# Patient Record
Sex: MALE | Race: OTHER | Employment: UNEMPLOYED | URBAN - METROPOLITAN AREA
[De-identification: names, ages, dates, MRNs, and addresses within clinical notes are randomized per-mention and may not be internally consistent; named-entity substitution may affect disease eponyms.]

---

## 2022-04-08 ENCOUNTER — TRANSCRIBE ORDER (OUTPATIENT)
Dept: SCHEDULING | Age: 62
End: 2022-04-08

## 2022-04-08 DIAGNOSIS — C25.9 SOLID PSEUDOPAPILLARY CARCINOMA OF PANCREAS (HCC): Primary | ICD-10-CM

## 2022-04-08 DIAGNOSIS — Z12.11 COLON CANCER SCREENING: ICD-10-CM

## 2022-04-08 DIAGNOSIS — K86.89 PANCREATIC MASS: ICD-10-CM

## 2022-04-29 ENCOUNTER — HOSPITAL ENCOUNTER (OUTPATIENT)
Dept: CT IMAGING | Age: 62
Discharge: HOME | End: 2022-04-29
Attending: INTERNAL MEDICINE
Payer: SUBSIDIZED

## 2022-04-29 DIAGNOSIS — C25.9 SOLID PSEUDOPAPILLARY CARCINOMA OF PANCREAS (HCC): ICD-10-CM

## 2022-04-29 DIAGNOSIS — Z12.11 COLON CANCER SCREENING: ICD-10-CM

## 2022-04-29 DIAGNOSIS — K86.89 PANCREATIC MASS: ICD-10-CM

## 2022-04-29 LAB — CREAT BLD-MCNC: 0.8 MG/DL (ref 0.6–1.3)

## 2022-04-29 PROCEDURE — 74011000636 HC RX REV CODE- 636: Performed by: INTERNAL MEDICINE

## 2022-04-29 PROCEDURE — 82565 ASSAY OF CREATININE: CPT

## 2022-04-29 PROCEDURE — 74170 CT ABD WO CNTRST FLWD CNTRST: CPT

## 2022-04-29 RX ADMIN — IOPAMIDOL 100 ML: 755 INJECTION, SOLUTION INTRAVENOUS at 09:31

## 2022-07-06 ENCOUNTER — HOSPITAL ENCOUNTER (OUTPATIENT)
Age: 62
Setting detail: OUTPATIENT SURGERY
Discharge: HOME | End: 2022-07-06
Attending: INTERNAL MEDICINE | Admitting: INTERNAL MEDICINE

## 2022-07-06 ENCOUNTER — ANESTHESIA EVENT (OUTPATIENT)
Dept: ENDOSCOPY | Age: 62
End: 2022-07-06

## 2022-07-06 ENCOUNTER — ANESTHESIA (OUTPATIENT)
Dept: ENDOSCOPY | Age: 62
End: 2022-07-06

## 2022-07-06 VITALS
WEIGHT: 192 LBS | SYSTOLIC BLOOD PRESSURE: 161 MMHG | DIASTOLIC BLOOD PRESSURE: 93 MMHG | HEART RATE: 81 BPM | BODY MASS INDEX: 34.02 KG/M2 | HEIGHT: 63 IN | RESPIRATION RATE: 23 BRPM | TEMPERATURE: 98.7 F | OXYGEN SATURATION: 93 %

## 2022-07-06 PROCEDURE — 74011000250 HC RX REV CODE- 250: Performed by: ANESTHESIOLOGY

## 2022-07-06 PROCEDURE — 76040000019: Performed by: INTERNAL MEDICINE

## 2022-07-06 PROCEDURE — 76060000031 HC ANESTHESIA FIRST 0.5 HR: Performed by: INTERNAL MEDICINE

## 2022-07-06 PROCEDURE — 74011250636 HC RX REV CODE- 250/636: Performed by: ANESTHESIOLOGY

## 2022-07-06 PROCEDURE — 2709999900 HC NON-CHARGEABLE SUPPLY: Performed by: INTERNAL MEDICINE

## 2022-07-06 PROCEDURE — 74011250636 HC RX REV CODE- 250/636: Performed by: INTERNAL MEDICINE

## 2022-07-06 RX ORDER — DEXTROMETHORPHAN/PSEUDOEPHED 2.5-7.5/.8
1.2 DROPS ORAL
Status: DISCONTINUED | OUTPATIENT
Start: 2022-07-06 | End: 2022-07-06 | Stop reason: HOSPADM

## 2022-07-06 RX ORDER — NALOXONE HYDROCHLORIDE 0.4 MG/ML
0.4 INJECTION, SOLUTION INTRAMUSCULAR; INTRAVENOUS; SUBCUTANEOUS
Status: DISCONTINUED | OUTPATIENT
Start: 2022-07-06 | End: 2022-07-06 | Stop reason: HOSPADM

## 2022-07-06 RX ORDER — FLUMAZENIL 0.1 MG/ML
0.2 INJECTION INTRAVENOUS
Status: DISCONTINUED | OUTPATIENT
Start: 2022-07-06 | End: 2022-07-06 | Stop reason: HOSPADM

## 2022-07-06 RX ORDER — EPINEPHRINE 0.1 MG/ML
1 INJECTION INTRACARDIAC; INTRAVENOUS
Status: DISCONTINUED | OUTPATIENT
Start: 2022-07-06 | End: 2022-07-06 | Stop reason: HOSPADM

## 2022-07-06 RX ORDER — LIDOCAINE HYDROCHLORIDE 20 MG/ML
INJECTION, SOLUTION EPIDURAL; INFILTRATION; INTRACAUDAL; PERINEURAL AS NEEDED
Status: DISCONTINUED | OUTPATIENT
Start: 2022-07-06 | End: 2022-07-06 | Stop reason: HOSPADM

## 2022-07-06 RX ORDER — SODIUM CHLORIDE 9 MG/ML
75 INJECTION, SOLUTION INTRAVENOUS CONTINUOUS
Status: DISCONTINUED | OUTPATIENT
Start: 2022-07-06 | End: 2022-07-06 | Stop reason: HOSPADM

## 2022-07-06 RX ORDER — PROPOFOL 10 MG/ML
INJECTION, EMULSION INTRAVENOUS AS NEEDED
Status: DISCONTINUED | OUTPATIENT
Start: 2022-07-06 | End: 2022-07-06 | Stop reason: HOSPADM

## 2022-07-06 RX ORDER — ATROPINE SULFATE 0.1 MG/ML
0.5 INJECTION INTRAVENOUS
Status: DISCONTINUED | OUTPATIENT
Start: 2022-07-06 | End: 2022-07-06 | Stop reason: HOSPADM

## 2022-07-06 RX ORDER — SODIUM CHLORIDE 0.9 % (FLUSH) 0.9 %
5-40 SYRINGE (ML) INJECTION AS NEEDED
Status: DISCONTINUED | OUTPATIENT
Start: 2022-07-06 | End: 2022-07-06 | Stop reason: HOSPADM

## 2022-07-06 RX ORDER — SODIUM CHLORIDE 0.9 % (FLUSH) 0.9 %
5-40 SYRINGE (ML) INJECTION EVERY 8 HOURS
Status: DISCONTINUED | OUTPATIENT
Start: 2022-07-06 | End: 2022-07-06 | Stop reason: HOSPADM

## 2022-07-06 RX ADMIN — PROPOFOL 180 MG: 10 INJECTION, EMULSION INTRAVENOUS at 14:09

## 2022-07-06 RX ADMIN — SODIUM CHLORIDE 75 ML/HR: 9 INJECTION, SOLUTION INTRAVENOUS at 13:53

## 2022-07-06 RX ADMIN — LIDOCAINE HYDROCHLORIDE 40 MG: 20 INJECTION, SOLUTION EPIDURAL; INFILTRATION; INTRACAUDAL; PERINEURAL at 14:00

## 2022-07-06 NOTE — H&P
Gastroenterology Outpatient History and Physical    Patient: Maia Homans    Physician: Zaida Lowery MD    Chief Complaint: CRC screening  History of Present Illness: No GI complaints    History:  Past Medical History:   Diagnosis Date    Hypertension     No past surgical history on file. Social History     Socioeconomic History    Marital status: SINGLE    History reviewed. No pertinent family history. There is no problem list on file for this patient. Allergies: No Known Allergies  Medications:   Prior to Admission medications    Not on File     Physical Exam:   Vital Signs: Blood pressure (!) 143/96, pulse 86, resp. rate (!) 94, height 5' 3\" (1.6 m), weight 87.1 kg (192 lb), SpO2 94 %.   General: well developed, well nourished   HEENT: unremarkable   Heart: regular rhythm no mumur    Lungs: clear   Abdominal:  benign   Neurological: unremarkable   Extremities: no edema     Findings/Diagnosis: CRC screening  Plan of Care/Planned Procedure: Colonoscopy with conscious/deep sedation    Signed:  Zaida Lowery MD 7/6/2022

## 2022-07-06 NOTE — ROUTINE PROCESS
Eduarda Aid  1960  [id number]    Situation:  Verbal report received from: Vinny Bridges  Procedure: Procedure(s):  COLONOSCOPY    Background:    Preoperative diagnosis: Solid pseudopapillary carcinoma of pancreas ([facility] [address] [C25.9]  Mass of pancreas [K86.89]  Colon cancer screening [Z12.11]  Postoperative diagnosis: hemorrhoids    :  Dr. Nya Inman  Assistant(s): Endoscopy Technician-1: Erinn Mead  Endoscopy RN-1: Maya Frazier RN    Specimens: * No specimens in log *  H. Pylori  no    Assessment:  Intra-procedure medications     Anesthesia gave intra-procedure sedation and medications, see anesthesia flow sheet yes    Intravenous fluids: NS@ KVO     Vital signs stable     Abdominal assessment: round and soft     Recommendation:  Discharge patient per MD order.   Return to floor  Family or Friend   Permission to share finding with family or friend yes

## 2022-07-06 NOTE — PROCEDURES
NAME:  Simin Dean   :   1960   MRN:   [id number]     Date/Time:  2022 2:11 PM    Colonoscopy Operative Report    Procedure Type:   Colonoscopy --screening     Indications:     Screening colonoscopy  Pre-operative Diagnosis: see indication above  Post-operative Diagnosis:  See findings below  :  Roma Meza MD  Referring Provider: --None    Exam:  Airway: clear, no airway problems anticipated  Heart: RRR, without gallops or rubs  Lungs: clear bilaterally without wheezes, crackles, or rhonchi  Abdomen: soft, nontender, nondistended, bowel sounds present  Mental Status: awake, alert and oriented to person, place and time    Sedation:  MAC anesthesia Propofol  Procedure Details:  After informed consent was obtained with all risks and benefits of procedure explained and preoperative exam completed, the patient was taken to the endoscopy suite and placed in the left lateral decubitus position. Upon sequential sedation as per above, a digital rectal exam was performed demonstrating internal hemorrhoids. The Olympus videocolonoscope  was inserted in the rectum and carefully advanced to the cecum, which was identified by the ileocecal valve and appendiceal orifice. The quality of preparation was good. The colonoscope was slowly withdrawn with careful evaluation between folds. Retroflexion in the rectum was completed demonstrating internal hemorrhoids. Findings:  1. Normal colonoscopy through to the cecum  2. Medium sized internal hemorrhoids seen on retroflexion    Specimen Removed:  None  Complications: None. EBL:  None. Impression:   1. Normal colonoscopy through to the cecum  2. Medium sized internal hemorrhoids seen on retroflexion    Recommendations:   1. Repeat colonoscopy in 10 years for screening     Discharge Disposition:  Home in the company of a  when able to ambulate.       Madisyn Gerard MD

## 2022-07-06 NOTE — ANESTHESIA POSTPROCEDURE EVALUATION
Procedure(s):  COLONOSCOPY.    total IV anesthesia    Anesthesia Post Evaluation        Patient location during evaluation: PACU  Note status: Adequate. Level of consciousness: responsive to verbal stimuli and sleepy but conscious  Pain management: satisfactory to patient  Airway patency: patent  Anesthetic complications: no  Cardiovascular status: acceptable  Respiratory status: acceptable  Hydration status: acceptable  Comments: +Post-Anesthesia Evaluation and Assessment    Patient: Rashmi Willis MRN: [id number]  SSN: [id number]   YOB: 1960  Age: 63 y.o. Sex: male      Cardiovascular Function/Vital Signs    BP (!) 161/93   Pulse 81   Temp 37.1 °C (98.7 °F)   Resp 23   Ht 5' 3\" (1.6 m)   Wt 87.1 kg (192 lb)   SpO2 93%   BMI 34.01 kg/m²     Patient is status post Procedure(s):  COLONOSCOPY. Nausea/Vomiting: Controlled. Postoperative hydration reviewed and adequate. Pain:  Pain Scale 1: Numeric (0 - 10) (07/06/22 1448)  Pain Intensity 1: 0 (07/06/22 1448)   Managed. Neurological Status: At baseline. Mental Status and Level of Consciousness: Arousable. Pulmonary Status:   O2 Device: None (Room air) (07/06/22 1435)   Adequate oxygenation and airway patent. Complications related to anesthesia: None    Post-anesthesia assessment completed. No concerns. Signed By: Dipak Hardwick DO    7/6/2022  Post anesthesia nausea and vomiting:  controlled      INITIAL Post-op Vital signs:   Vitals Value Taken Time   /93 07/06/22 1448   Temp 37.1 °C (98.7 °F) 07/06/22 1436   Pulse 85 07/06/22 1448   Resp 22 07/06/22 1448   SpO2 92 % 07/06/22 1448   Vitals shown include unvalidated device data.

## 2022-07-06 NOTE — DISCHARGE INSTRUCTIONS
[address]  [id number]  1960    COLON DISCHARGE INSTRUCTIONS  Discomfort:  Redness at IV site- apply warm compress to area; if redness or soreness persist- contact your physician  There may be a slight amount of blood passed from the rectum  Gaseous discomfort- walking, belching will help relieve any discomfort  You may not operate a vehicle for 12 hours  You may not engage in an occupation involving machinery or appliances for rest of today  You may not drink alcoholic beverages for at least 12 hours  Avoid making any critical decisions for at least 24 hour  DIET:   Regular diet. - however -  remember your colon is empty and a heavy meal will produce gas. Avoid these foods:  vegetables, fried / greasy foods, carbonated drinks for today  MEDICATION:  Per Medication Reconciliation       ACTIVITY:  You may not resume your normal daily activities until tomorrow AM; it is recommended that you spend the remainder of the day resting -  avoid any strenuous activity. CALL M.D. ANY SIGN OF:   Increasing pain, nausea, vomiting  Abdominal distension (swelling)  New increased bleeding (oral or rectal)  Fever (chills)  Pain in chest area  Bloody discharge from nose or mouth  Shortness of breath    You may  take any Advil, Aspirin, Ibuprofen, Motrin, Aleve, or Goodys for 10 days, ONLY  Tylenol as needed for pain. IMPRESSION:  Impression:   1. Normal colonoscopy through to the cecum  2. Medium sized internal hemorrhoids seen on retroflexion    Recommendations:   1.  Repeat colonoscopy in 10 years for screening     Follow-up Instructions:  Telephone # [phone]    Je Becerra MD

## 2022-07-06 NOTE — PROGRESS NOTES
Interpreters were used for admission as well as consents.  Louis Lerner [phone] for consents and was disconnect.    Janine [phone] for admission and anesthesia

## 2022-07-06 NOTE — ANESTHESIA PREPROCEDURE EVALUATION
Relevant Problems   No relevant active problems       Anesthetic History   No history of anesthetic complications            Review of Systems / Medical History  Patient summary reviewed, nursing notes reviewed and pertinent labs reviewed    Pulmonary  Within defined limits                 Neuro/Psych   Within defined limits           Cardiovascular    Hypertension              Exercise tolerance: >4 METS  Comments: No EKG on file   GI/Hepatic/Renal  Within defined limits             Comments: ?  Mass of pancreas, not seen on current CT     colon cancer screening Endo/Other  Within defined limits           Other Findings   Comments: Snores when sleep         Physical Exam    Airway  Mallampati: III  TM Distance: 4 - 6 cm  Neck ROM: normal range of motion   Mouth opening: Normal     Cardiovascular  Regular rate and rhythm,  S1 and S2 normal,  no murmur, click, rub, or gallop             Dental      Comments: Missing some teeth in front on top   Pulmonary  Breath sounds clear to auscultation               Abdominal  GI exam deferred       Other Findings            Anesthetic Plan    ASA: 2  Anesthesia type: total IV anesthesia          Induction: Intravenous  Anesthetic plan and risks discussed with: Patient

## 2023-01-13 ENCOUNTER — OFFICE VISIT (OUTPATIENT)
Dept: HEMATOLOGY | Age: 63
End: 2023-01-13

## 2023-01-13 VITALS
DIASTOLIC BLOOD PRESSURE: 92 MMHG | HEIGHT: 63 IN | TEMPERATURE: 98.2 F | WEIGHT: 178 LBS | SYSTOLIC BLOOD PRESSURE: 136 MMHG | RESPIRATION RATE: 17 BRPM | OXYGEN SATURATION: 99 % | HEART RATE: 76 BPM | BODY MASS INDEX: 31.54 KG/M2

## 2023-01-13 DIAGNOSIS — K76.89 LIVER CYST: ICD-10-CM

## 2023-01-13 DIAGNOSIS — K86.2 PANCREATIC CYST: Primary | ICD-10-CM

## 2023-01-13 PROBLEM — M86.179 ACUTE OSTEOMYELITIS OF ANKLE (HCC): Status: ACTIVE | Noted: 2021-12-10

## 2023-01-13 PROBLEM — N50.89 MASS OF TESTICLE: Status: ACTIVE | Noted: 2022-06-03

## 2023-01-13 PROBLEM — M86.9 OSTEOMYELITIS OF RIGHT TIBIA (HCC): Status: ACTIVE | Noted: 2021-12-07

## 2023-01-13 PROBLEM — M86.671 CHRONIC OSTEOMYELITIS INVOLVING ANKLE AND FOOT, RIGHT (HCC): Status: ACTIVE | Noted: 2022-01-05

## 2023-01-13 PROBLEM — I10 PRIMARY HYPERTENSION: Status: ACTIVE | Noted: 2022-01-19

## 2023-01-13 PROBLEM — R03.0 ELEVATED BLOOD PRESSURE READING WITHOUT DIAGNOSIS OF HYPERTENSION: Status: ACTIVE | Noted: 2023-01-13

## 2023-01-13 PROBLEM — S82.201R: Status: ACTIVE | Noted: 2022-01-10

## 2023-01-13 PROBLEM — Q45.3 ANOMALY OF PANCREAS: Status: ACTIVE | Noted: 2022-06-03

## 2023-01-13 PROBLEM — M19.91 PRIMARY OSTEOARTHRITIS: Status: ACTIVE | Noted: 2022-06-03

## 2023-01-13 PROBLEM — K08.109 TEETH MISSING: Status: ACTIVE | Noted: 2022-06-03

## 2023-01-13 PROBLEM — S82.401R: Status: ACTIVE | Noted: 2022-01-10

## 2023-01-13 PROBLEM — S82.209A TIBIA FRACTURE: Status: ACTIVE | Noted: 2021-05-20

## 2023-01-13 PROBLEM — S92.901K: Status: ACTIVE | Noted: 2021-12-10

## 2023-01-13 PROBLEM — G57.10 MERALGIA PARESTHETICA: Status: ACTIVE | Noted: 2022-06-03

## 2023-01-13 PROBLEM — E66.9 OBESITY (BMI 30.0-34.9): Status: ACTIVE | Noted: 2022-01-19

## 2023-01-13 RX ORDER — LOSARTAN POTASSIUM 50 MG/1
TABLET ORAL
COMMUNITY

## 2023-01-13 RX ORDER — MULTIVITAMIN
TABLET ORAL
COMMUNITY

## 2023-01-13 RX ORDER — IBUPROFEN 100 MG/5ML
1000 SUSPENSION, ORAL (FINAL DOSE FORM) ORAL DAILY
COMMUNITY

## 2023-01-13 NOTE — PROGRESS NOTES
[facility] [address] [facility] Stephanie Montgomery MD, Rolf Schneider, PA-C    April S Mu, AGPCNP-BC   Irwin Julianna, [facility]   Carey Ivory, FNP-C  Fely Zheng, FNP-C   Angie Easton, AGPCNP-BC      [address]   at [address], [address] [place] [address] Desire Rodrigues  22.   [phone]   FAX: [phone]   at [address], [address]   [phone]   FAX: [phone]       Patient Care Team:  None as PCP - General      Problem List  Date Reviewed: 7/6/2022            Codes Class Noted    Elevated blood pressure reading without diagnosis of hypertension ICD-10-CM: R03.0  ICD-9-CM: 796.2  1/13/2023        Anomaly of pancreas ICD-10-CM: Q45.3  ICD-9-CM: 751.7  6/3/2022        Mass of testicle ICD-10-CM: N50.89  ICD-9-CM: 608.89  6/3/2022        Meralgia paresthetica ICD-10-CM: G57.10  ICD-9-CM: 355.1  6/3/2022        Primary osteoarthritis ICD-10-CM: M19.91  ICD-9-CM: 715.10  6/3/2022        Teeth missing ICD-10-CM: K08.109  ICD-9-CM: 525.10  6/3/2022        Obesity (BMI 30.0-34.9) ICD-10-CM: E66.9  ICD-9-CM: 278.00  1/19/2022        Pancreatic cyst ICD-10-CM: K86.2  ICD-9-CM: 577.2  1/19/2022        Primary hypertension ICD-10-CM: I10  ICD-9-CM: 401.9  1/19/2022        Fracture, tibia and fibula, right, open type III, with malunion, subsequent encounter ICD-10-CM: S82.201R, S82.401R  ICD-9-CM: 733.81  1/10/2022    Overview Signed 1/13/2023  7:46 AM by Aline Howard of this note might be different from the original.  Added automatically from request for surgery [id number]             Chronic osteomyelitis involving ankle and foot, right ([facility] ICD-10-CM: J80.730  ICD-9-CM: 730.17  1/5/2022        Acute osteomyelitis of ankle ([facility].) ICD-10-CM: M86.179  ICD-9-CM: 730.07 12/10/2021    Overview Signed 1/13/2023  7:46 AM by Eric Devi of this note might be different from the original.  Added automatically from request for surgery [id number]             Nonunion of foot fracture, right ICD-10-CM: S92.901K  ICD-9-CM: 733.82  12/10/2021    Overview Signed 1/13/2023  7:46 AM by Eric Devi of this note might be different from the original.  Undergoing Intramedullary doc insertion tiba (right leg lower) with ankle fusion with Dr. Nahid Valencia on 1/26/22 in [facility]             Osteomyelitis of right tibia [facility]) ICD-10-CM: M86.9  ICD-9-CM: 730.26  12/7/2021        Tibia fracture ICD-10-CM: S82.209A  ICD-9-CM: 823.80  5/20/2021         Right foot in brace, cane  Mom:MI, HTN  Dad: complication of CVA  Brother    Single,  Angus kids, four grandkids. No cigarettes, no etoh. [address]  16 ortho surgeries  Heading: possible pancreas cancer    Pancreatic mass, 9/2020 on MRI, then CT. EUS FNA  Path: solid psuedopapillary neoplasm. Planned for [facility]. Now not seen on recent imaging. The clinicians listed above have asked me to see Bradley Makeda in consultation regarding a liver mass. All medical records sent by the referring physicians were reviewed including imaging studies     The patient is a 63 y.o.  male without any history of previous liver disease. The patient underwent   an ultrasound   a CT scan   a MRI   for evaluation of   non-specific abdominal pain   right upper quadrant abdominal pain   surveillance for liver cancer   in ***/***. This demonstrated a   single mass measuring *** cm   multiple masses measuring ***-*** cms   in the   right   and   left   lobe of the liver. The patient had been taking   anabolic steroids   high doses of health food supplements   estrogen and/or progesterone   for   birth control   menopausal symptoms   body building   since ***/***.       These   medications   supplements   were stopped ***   weeks ago. months ago. years ago. The patient is known to have an extrahepatic malignancy. The following cancer makers were performed;   AFP  CA 19-9  CEA  CA-125  PSA    AFP was   All cancer markers were   normal   elevated   not performed or the results and not available. Imaging studies of the liver   are not available for review   suggest   a normal liver   chronic liver disease   cirrhosis   in addition to the   mass. cyst.      A biopsy of the liver mass was performed in ***/***. The results of this biopsy   are not available at this time   demonstrated   no fibrosis   portal fibrosis   bridging fibrosis   cirrhosis   and   that the lesion was a   focal nodular hyperplasia   hemangioma   adenoma   metastatic cancer from a ***. The patient   does not have any symptoms which could be attributed to the liver disorder. has the following symptoms which could be attributed to the liver disorder:    fatigue,   fevers,   chills,   shortness of breath,   chest pain,   pain in the right side over the liver,   diffuse abdominal pain,   nausea,   vomiting,   constipation,   diarrhea,   dry eyes,   dry mouth,   arthralgias,   myalgias,   yellowing of the eyes or skin,   itching,   dark urine,   problems concentrating,   swelling of the abdomen,   swelling of the lower extremities,   hematemesis,   hematochezia. The patient is not experiencing the following symptoms which are commonly seen in this liver disorder:   fatigue,   fevers,   chills,   shortness of breath,   chest pain,   pain in the right side over the liver,   diffuse abdominal pain,   nausea,   vomiting,   constipation,   diarrhea,   dry eyes,   dry mouth,   arthralgias,   myalgias,   yellowing of the eyes or skin,   itching,   dark urine,   problems concentrating,   swelling of the abdomen,   swelling of the lower extremities,   hematemesis,   hematochezia.     The patient   completes all daily activities without any functional limitations. has   Mild   Moderate   Severe   limitations in functional activities which can be attributed to   the liver disease   and   to other medical problems that are not related to the liver disease. ASSESSMENT AND PLAN:  Liver mass   This is   of unknown etiology. most likely to be   benign in the absence of chronic liver disease. a metastasis to the liver. hepatocellular carcinoma. Focal Nodular Hyperplasia. Hepatic adenoma. Hemangioma. Nodular regenerative hyperplasia. The mass is atypical and concerning for possible malignancy. Will schedule for needle biopsy of the mass under   ultrasound   CT   guidance. Hemangiomas   Focal Nodular Hyperplasia  are benign and in general do not increase in size over time. These lesions are not responsive to estrogens or progesterones. HRT or OCH do not need to be stopped if the patient is taking these medications or these medications can be started if necessary. Repeated imaging to monitor the size of these lesions is not necessary   once  now that  the diagnosis has been confirmed. Hepatic adenoma is a pre-malignant lesion. The risk of malignancy is increased only if the adenoma is greater than 5 cm. Adenomas are sensitive to estrogens and progestins. HRT or OCH should not be given to the patient or stopped if the patient is already taking these medications. An alternative form of contraception should be utilized. Stopping hormonal therapy will lead to the lesion shrinking in size. If the lesion reduces in size to less than 4 cm it can safely be monitored and does not need to be surgically resected. The adenoma is *** cm in diameter,   is unlikely to shrink below 5 cm and should be surgically removed. is highly likely to shrink below 5 cm and can be monitored with repeat imaging.     Sometime it is necessary to follow liver masses with dynamic MRI and/or triple phase CT serially until it becomes clear what the mass this is. Biopsy is rarely necessary and should be avoided if possible because all 3 of these lesions are vascular and and there is an increased risk of bleeding with biopsy. The most common reason for small changes in the reported size of lese lesions is that they were measured with different techniques, IV contrast vs no contrast, the scans were performed on different scanners, and interpreted by different radiologists. The patient is   asymptomatic   has non-specific RUQ pain which   has severe RUQ pain which   may be   is unlikely to be   secondary to the liver mass. Laboratory studies   Elastography,   Fibroscan   and imaging suggest the patient has   no evidence of liver disease. chronic liver disease. cirrhosis. an extrahepatic malignancy. Will perform   Have performed   laboratory testing to monitor liver function and degree of liver injury. This included   BMP,   hepatic panel,   CBC with platelet count,   INR. Will measure the following serologic cancer markers   AFP  CA 19-9  CEA    PSA    Serologic testing for causes of chronic liver disease   was ordered. was negative for     was positive for   HCV  HBV   SIMONE   ASMA  AMA  Alpha-1-antitrypsin  Ceruloplasmin  an elevation in   Ferritin  FE saturation  Will perform additional serologic tests to screen for other causes of chronic liver disease. Will perform imaging of the liver with   ultrasound. triple phase CT scan. dynamic MRI. MRI and MRCP because of persistent elevation in ALP and possible bile duct disease. Treatment of other medical problems in patients with chronic liver disease  There are no contraindications for the patient to take most medications that are necessary for treatment of other medical issues. The patient has cirrhrosis and should avoid taking NSAIDs which are associated with a higher rate of developing HARLAN.       The patient had HE and should avoid taking Benzodiazapines which could exacerbate HE. The patient can take   any medications utilized for treatment of DM  statins to treat hypercholesterolemia    The patient has alcohol induced liver disease but has been abstinent from alcohol for greater than 6 months. Normal doses of acetaminophen, as recommended on the label of the bottle, are not hepatotoxic except in the setting of daily alcohol use, even in patients with cirrhosis and can be utilized for pain. The patient consumes alcohol on a daily basis or has recently stopped consuming alcohol. Regular alcohol use increases the risk of toxicity from acetaminophen. This analgesic should be avoided until the patient has been abstinent from alcohol for 6 months. Counseling for alcohol in patients with chronic liver disease  The patient was counseled regarding alcohol consumption and the effect of alcohol on chronic liver disease. The patient has cirrhosis and was advised to be abstinent from all alcohol including non-alcoholic beer which does contain some alcohol. The patient does not consume any significant amount of alcohol. The patient has not consumed alcohol since ***. The patient has continued to consume alcohol   daily. on rare social occasions. The patient was reminded that alcohol can cause fatty liver. Patients who have undergone obesity surgery are at much greater risk to develop alcohol induced liver injury. The patient does not have a chronic liver disease and does not have to be abstinent from alcohol. The patient consumes too much alcohol and is at risk to develop alcohol induced liver liver injury. It was recommended that all alcohol consumption be stopped and the patient be abstinent from alcohol for at least *** months. If the patient cannot stop consuming alcohol then there is an aclohol use disorder and the patient should consider entering alcohol counseling and/or attending AA.     The patient has an alcohol abuse disorder and it was suggested that they enter alcohol counseling and/or attend AA. If the patient cannot stop drinking alcohol they cannot be considered a candidate for liver transplant. The patient will need to attend alcohol counceling prior to being accepted as a liver transplant candidate. Vaccinations   Vaccination for viral hepatitis A and B is recommended since the patient has no serologic evidence of previous exposure or vaccination with immunity. Vaccination for viral hepatitis A and B is not needed. The patient has serologic evidence of prior exposure or vaccination with immunity. Vaccination for viral hepatitis A and B has been initiated. Vaccination for viral hepatitis A and B has been completed. Serologic studies will be performed to assess response to vaccine. The need for vaccination against viral hepatitis A and B will be assessed with serologic and instituted as appropriate. Since the patient does not have a chronic liver disease which can lead to liver injury screening for HAV and HBV is not needed. The patient has   not   received   2 doses   and the booster dose   of COVID-19 vaccine. The patient should receive a booster dose of COVID-19 vaccine in ***/***. The patient had COVID-19 infection and recovered. The patient does not want to take COVID-19 vaccine. The patient was encouraged to take the COVID-19 vaccine. Routine vaccinations against other bacterial and viral agents can be performed as indicated. Annual flu vaccination should be administered if indicated. ALLERGIES  No Known Allergies    MEDICATIONS  Current Outpatient Medications   Medication Sig    ascorbic acid, vitamin C, (VITAMIN C) 1,000 mg tablet Take 1,000 mg by mouth daily. Vit B Comp & C-Vit E-FA-Angie-Zn 0.4 mg tab 1 tablet    losartan (COZAAR) 50 mg tablet 1 tablet     No current facility-administered medications for this visit.        SYSTEM REVIEW NOT RELATED TO LIVER DISEASE OR REVIEWED ABOVE:  Constitution systems: Negative for fever, chills, weight gain, weight loss. Eyes: Negative for visual changes. ENT: Negative for sore throat, painful swallowing. Respiratory: Negative for cough, hemoptysis, SOB. Cardiology: Negative for chest pain, palpitations. GI:  Negative for constipation or diarrhea. : Negative for urinary frequency, dysuria, hematuria, nocturia. Skin: Negative for rash. Hematology: Negative for easy bruising, blood clots. Musculo-skelatal: Negative for back pain, muscle pain, weakness. Neurologic: Negative for headaches, dizziness, vertigo, memory problems not related to HE. Psychology: Negative for anxiety, depression. FAMILY HISTORY:  The patient is adopted and does not know any of his family history. The father    Has/had the following following chronic disease(s): ***.     of ***.    unknown cause. The patient has no knowledge of the father's medical condition. The mother   Has/had the following chronic disease(s): ***.     of ***.    unknown cause. The patient has no knowledge of the mother's medical condition. There is no family history of liver disease. The following family members have liver disease: There is no family history of immune disorders. The following family members have immune disorders:        SOCIAL HISTORY:  The patient   is . has never been . is . is . is . The patient has  no children. 1 child. *** children,   *** stepchildren,   and   *** grandchildren. The patient   has never used tobacco products. stopped using tobacco products in ***/***.    currently smokes *** pack of tobacco daily. currently smokes *** cigarettes daily. currently smokes *** cigars daily. currently chews tobacco.      The patient vapes. The patient   smokes   uses edible   Marijuana. uses CBD oil.     The patient   has never consumed significant amounts of alcohol. consumes *** alcoholic beverages per   day   week. consumes alcohol in excess. consumes alcohol on social occasions never in excess. has previously consumed alcohol in excess. has previously consumed alcohol socially never in excess. The patient has been abstinent from alcohol since ***/***. The patient   currently works full time as ***.    currently works part time as ***.    used to work as ***.    does not work outside the home. The patient   retired in ***.    has not worked since ***.    is applying for disability. is currently receiving disability. PHYSICAL EXAMINATION:  Visit Vitals  BP (!) 136/92   Pulse 76   Temp 98.2 °F (36.8 °C) (Temporal)   Resp 17   Ht 5' 3\" (1.6 m)   Wt 178 lb (80.7 kg)   SpO2 99%   BMI 31.53 kg/m²       General: No acute distress. Eyes: Sclera anicteric. ENT: No oral lesions. Thyroid normal.  Nodes: No adenopathy. Skin: No spider angiomata. No jaundice. No palmar erythema. Respiratory: Lungs clear to auscultation. Cardiovascular: Regular heart rate. No murmurs. No JVD. Abdomen: Soft non-tender, liver size normal to percussion/palpation. Spleen not palpable. No obvious ascites. Extremities: No edema. No muscle wasting. No gross arthritic changes. Neurologic: Alert and oriented. Cranial nerves grossly intact. No asterixis. LABORATORY STUDIES:  Recent liver function panel, CBC with platelet count and BMP are not available. These studies will be performed. SEROLOGIES:  Not available or performed. Testing was performed today. LIVER HISTOLOGY:  Not available or performed    ENDOSCOPIC PROCEDURES:  Not available or performed    RADIOLOGY:  Not available or performed    OTHER TESTING:  Not available or performed    FOLLOW-UP:  All of the issues listed above in the Assessment and Plan were discussed with the patient. All questions were answered.   The patient expressed a clear understanding of the above.    [address] [address] in ***   weeks   months   for Fibroscan   for elastography   2 weeks after liver biopsy. which should be 1-2 weeks after the next imaging study. and to initiate HCV treatment. to assess for the effects of diet changes and weight loss. to assess the effects and tolerability of ***.  for screening and enrollment into a clinical trial for treatment of ***. The patient was given a follow-up appointment for *** months in case she decides not to enter or is excluded from entering the clinical trial.  for routine monitoring. to review all data and determine the treatment plan.

## 2023-01-13 NOTE — PROGRESS NOTES
Identified pt with two pt identifiers(name and ). Reviewed record in preparation for visit and have obtained necessary documentation. Chief Complaint   Patient presents with    New Patient     Establish care      Vitals:    23 [id number] 23 0802   BP: (!) 144/96 (!) 136/92   Pulse: 76    Resp: 17    Temp: 98.2 °F (36.8 °C)    TempSrc: Temporal    SpO2: 99%    Weight: 178 lb (80.7 kg)    Height: 5' 3\" (1.6 m)    PainSc:   0 - No pain        Health Maintenance Review: Patient reminded of \"due or due soon\" health maintenance. I have asked the patient to contact his/her primary care provider (PCP) for follow-up on his/her health maintenance. Coordination of Care Questionnaire:  :   1) Have you been to an emergency room, urgent care, or hospitalized since your last visit? If yes, where when, and reason for visit? no       2. Have seen or consulted any other health care provider since your last visit? If yes, where when, and reason for visit? NO      Patient is accompanied by self I have received verbal consent from Adan Hanley to discuss any/all medical information while they are present in the room.

## 2023-01-13 NOTE — Clinical Note
2/4/2023    Patient: Allison Suggs   YOB: 1960   Date of Visit: 1/13/2023     Ira Adrian MD  [address]  [place]    Dear Ira Adrian MD,      Thank you for referring Mr. Allison Suggs to [address] for evaluation. My notes for this consultation are attached. If you have questions, please do not hesitate to call me. I look forward to following your patient along with you.       Sincerely,    Justen Wharton MD

## 2023-01-30 PROBLEM — Q45.3 ANOMALY OF PANCREAS: Status: RESOLVED | Noted: 2022-06-03 | Resolved: 2023-01-30

## 2023-01-30 PROBLEM — S82.209A TIBIA FRACTURE: Status: RESOLVED | Noted: 2021-05-20 | Resolved: 2023-01-30

## 2023-01-30 PROBLEM — M86.179 ACUTE OSTEOMYELITIS OF ANKLE (HCC): Status: RESOLVED | Noted: 2021-12-10 | Resolved: 2023-01-30

## 2023-01-30 PROBLEM — K76.89 BENIGN LIVER CYST: Status: ACTIVE | Noted: 2023-01-30

## 2023-01-30 PROBLEM — R03.0 ELEVATED BLOOD PRESSURE READING WITHOUT DIAGNOSIS OF HYPERTENSION: Status: RESOLVED | Noted: 2023-01-13 | Resolved: 2023-01-30

## (undated) DEVICE — Device

## (undated) DEVICE — TOWEL 4 PLY TISS 19X30 SUE WHT

## (undated) DEVICE — BASIN EMSIS 16OZ GRAPHITE PLAS KID SHP MOLD GRAD FOR ORAL

## (undated) DEVICE — HYPODERMIC SAFETY NEEDLE: Brand: MAGELLAN

## (undated) DEVICE — Z DISCONTINUED PER MEDLINE LINE GAS SAMPLING O2/CO2 LNG AD 13 FT NSL W/ TBNG FILTERLINE

## (undated) DEVICE — SET ADMIN 16ML TBNG L100IN 2 Y INJ SITE IV PIGGY BK DISP

## (undated) DEVICE — SYR 10ML LUER LOK 1/5ML GRAD --

## (undated) DEVICE — ELECTRODE,RADIOTRANSLUCENT,FOAM,5PK: Brand: MEDLINE

## (undated) DEVICE — 1200 GUARD II KIT W/5MM TUBE W/O VAC TUBE: Brand: GUARDIAN

## (undated) DEVICE — SOLIDIFIER FLD 2OZ 1500CC N DISINF IN BTL DISP SAFESORB

## (undated) DEVICE — NEONATAL-ADULT SPO2 SENSOR: Brand: NELLCOR

## (undated) DEVICE — CATH IV AUTOGRD BC PNK 20GA 25 -- INSYTE

## (undated) DEVICE — SYR 3ML LL TIP 1/10ML GRAD --